# Patient Record
Sex: FEMALE | Race: WHITE | Employment: FULL TIME | URBAN - METROPOLITAN AREA
[De-identification: names, ages, dates, MRNs, and addresses within clinical notes are randomized per-mention and may not be internally consistent; named-entity substitution may affect disease eponyms.]

---

## 2023-11-28 ENCOUNTER — OFFICE VISIT (OUTPATIENT)
Dept: OBSTETRICS AND GYNECOLOGY | Facility: CLINIC | Age: 28
End: 2023-11-28
Payer: COMMERCIAL

## 2023-11-28 VITALS
HEIGHT: 64 IN | BODY MASS INDEX: 34.31 KG/M2 | DIASTOLIC BLOOD PRESSURE: 82 MMHG | SYSTOLIC BLOOD PRESSURE: 112 MMHG | WEIGHT: 201 LBS

## 2023-11-28 DIAGNOSIS — Z30.432 ENCOUNTER FOR IUD REMOVAL: Primary | ICD-10-CM

## 2023-11-28 DIAGNOSIS — Z31.69 ENCOUNTER FOR PRECONCEPTION CONSULTATION: ICD-10-CM

## 2023-11-28 PROCEDURE — 1036F TOBACCO NON-USER: CPT

## 2023-11-28 PROCEDURE — 58301 REMOVE INTRAUTERINE DEVICE: CPT

## 2023-11-28 NOTE — PROGRESS NOTES
Patient ID: Sindy Knowles is a 28 y.o. female.    IUD Removal    Date/Time: 11/28/2023 2:42 PM    Performed by: SHAUN Palm  Authorized by: SHAUN Palm    Consent:     Consent obtained:  Verbal and written    Consent given by:  Patient    Procedure risks and benefits discussed: yes      Patient questions answered: yes      Patient agrees, verbalizes understanding, and wants to proceed: yes      Educational handouts given: yes      Instructions and paperwork completed: yes    Procedure:     Removed with no complications: yes      Removal due to mechanical complications of IUD: no      Removal due to infection and inflammatory reaction: no      Other reason for removal:  Trying to conceive.   Mirena removal  Conceive first child   ANA Garsia     Patient is here today for Mirena IUD removal. Patient plans on trying to conceive.     Discussed with patient optimal timing of intercourse to improve the odds of conceiving. Discussed with patient ways to track ovulation including BBT, cervical mucus changes, and use of ovulation test strips. Discussed and offered Genetic carrier screening. Patient encouraged to take PNV daily with 400 mcg of folic acid.

## 2025-01-15 ENCOUNTER — APPOINTMENT (OUTPATIENT)
Dept: OBSTETRICS AND GYNECOLOGY | Facility: CLINIC | Age: 30
End: 2025-01-15
Payer: COMMERCIAL

## 2025-01-15 VITALS — BODY MASS INDEX: 34.36 KG/M2 | SYSTOLIC BLOOD PRESSURE: 114 MMHG | WEIGHT: 200.2 LBS | DIASTOLIC BLOOD PRESSURE: 68 MMHG

## 2025-01-15 DIAGNOSIS — Z3A.09 9 WEEKS GESTATION OF PREGNANCY (HHS-HCC): ICD-10-CM

## 2025-01-15 DIAGNOSIS — Z34.91 PRENATAL CARE IN FIRST TRIMESTER (HHS-HCC): Primary | ICD-10-CM

## 2025-01-15 LAB — PREGNANCY TEST URINE, POC: POSITIVE

## 2025-01-15 PROCEDURE — 90656 IIV3 VACC NO PRSV 0.5 ML IM: CPT

## 2025-01-15 PROCEDURE — 90471 IMMUNIZATION ADMIN: CPT

## 2025-01-15 PROCEDURE — 0500F INITIAL PRENATAL CARE VISIT: CPT

## 2025-01-15 PROCEDURE — 81025 URINE PREGNANCY TEST: CPT

## 2025-01-15 RX ORDER — DOXYLAMINE SUCCINATE 25 MG/1
12.5 TABLET ORAL NIGHTLY PRN
Qty: 30 TABLET | Refills: 1 | Status: SHIPPED | OUTPATIENT
Start: 2025-01-15

## 2025-01-15 RX ORDER — ASPIRIN 81 MG/1
162 TABLET ORAL DAILY
Qty: 180 TABLET | Refills: 3 | Status: SHIPPED | OUTPATIENT
Start: 2025-01-15 | End: 2026-01-15

## 2025-01-15 RX ORDER — DOXYLAMINE SUCCINATE 25 MG/1
12.5 TABLET ORAL
COMMUNITY
Start: 2024-12-19 | End: 2025-01-15 | Stop reason: SDUPTHER

## 2025-01-15 RX ORDER — PYRIDOXINE HCL (VITAMIN B6) 100 MG
100 TABLET ORAL
COMMUNITY
Start: 2024-12-19

## 2025-01-15 ASSESSMENT — EDINBURGH POSTNATAL DEPRESSION SCALE (EPDS)
I HAVE BEEN ABLE TO LAUGH AND SEE THE FUNNY SIDE OF THINGS: AS MUCH AS I ALWAYS COULD
THE THOUGHT OF HARMING MYSELF HAS OCCURRED TO ME: NEVER
I HAVE FELT SAD OR MISERABLE: NO, NOT AT ALL
THINGS HAVE BEEN GETTING ON TOP OF ME: NO, MOST OF THE TIME I HAVE COPED QUITE WELL
TOTAL SCORE: 4
I HAVE BEEN SO UNHAPPY THAT I HAVE HAD DIFFICULTY SLEEPING: NOT AT ALL
I HAVE BLAMED MYSELF UNNECESSARILY WHEN THINGS WENT WRONG: NOT VERY OFTEN
I HAVE BEEN SO UNHAPPY THAT I HAVE BEEN CRYING: NO, NEVER
I HAVE BEEN ANXIOUS OR WORRIED FOR NO GOOD REASON: HARDLY EVER
I HAVE FELT SCARED OR PANICKY FOR NO GOOD REASON: NO, NOT MUCH
I HAVE LOOKED FORWARD WITH ENJOYMENT TO THINGS: AS MUCH AS I EVER DID

## 2025-01-15 NOTE — PROGRESS NOTES
Subjective   Sindy Knowles is a 29 y.o.  at Unknown with a working estimated date of delivery of Not found. who presents for an initial prenatal visit. This pregnancy is planned.  Occupation:   Partner: [employer],  [employer].    Patient currently experiencing: nausea/vomitting,  , breast tenderness, fatigue. Taking b6 and unisom and doing well  Taking prenatal vitamin: Yes  Dating: by regular LMP    Pregravid BMI: 33.80  BMI over 30- 33.80. Hemoglobin A1C ordered. Limiting weight gain to 11-20lbs through healthy eating habits and exercise reviewed    Preeclampsia risk:  History of hypertension:  none  ASA prophylaxis: counseled and agreeable  Ob HX: IAB  PMH: none  Hx of metal health disorders: none  PSHX: appendectomy  SH: patient denies use of drugs, alcohol, or tobacco.   Pap HX: uncertain of last pap, though reports no abnormals    Counseled on flu and covid vaccines and agreeable.     OB History    Para Term  AB Living   1 0 0 0 0 0   SAB IAB Ectopic Multiple Live Births   0 0 0 0 0      # Outcome Date GA Lbr Pedrito/2nd Weight Sex Type Anes PTL Lv   1 Current              [place]  Depression Scale Total: 4  Prior pregnancy complications:   Medical Problems        Objective   Weight: 90.8 kg (200 lb 3.2 oz)  TW.452 kg (3 lb 3.2 oz)   Expected Total Weight Gain: 5 kg (11 lb)-9 kg (19 lb)   Pregravid BMI: 33.80     Pregnancy Problems (from 01/15/25 to present)       No problems associated with this episode.             Assessment /Plan     Genetic testing: The patient was counseled regarding prenatal genetic testing options and was provided literature in the obstetric folder. First line screening options, including cfDNA and first trimester ultrasound for nuchal translucency, were discussed and offered.  Benefits, drawbacks, and limitations were explained respectively.  It was clearly stated that only diagnostic testing via CVS or amniocentesis provides definitive information  regarding a genetic diagnosis in the fetus (risk of complications with CVS 1/200, risk of fetal loss with CVS 1/400; risk of complications with amniocentesis 1/400 and a risk of fetal loss with amniocentesis 1/1000). It was discussed with the patient that the false positive rates for NIPT is higher for women under 35 years of age. It was encouraged that patient's with high risk personal/family history be referred to genetics for additional screening and counselling. This patient has decided to pursue NIPS and NT scan. Patient declines carrier screening    Education provided r/t nutrition, prenatal vitamins, folic acid supplementation, dietary guidelines, exercise, smoking, alcohol, caffeine and drug use. Healthy weight gain in pregnancy discussed.     Physical activity -patient encouraged to be physically active throughout pregnancy to promote healthy weight gain.     Safety- patient informed not to clean litter boxes  and that if she does gardening to wear gloves. Discussed with patient that she may use Tylenol as a pain reliever, but to avoid the use of NSAIDS. Patient informed of safe seat belt use, avoidance of hot tubs and saunas, and when to stop air travel.     Dental health- patient encouraged to maintain good dental health through pregnancy with routine dental screening.     Infant feeding- discussed patient's infant feeding preferences and strongly encouraged breastfeeding     Patient is appropriate for midwifery care. Patient oriented to practice, collaborative practice model, and educated on visit frequency    Warning s/s discussed and SAB precautions reviewed.   RTC 4wks/prn -    NOB plan:   PE with pap at next visit- add gc/ct  Patient to schedule nurse visit for all new OB labs Monday  Patient to schedule NT scan   Flu vaccine today  Patient to begin  mg daily  Continue PNV  MITCH LANIER MA

## 2025-01-17 LAB — BACTERIA UR CULT: NORMAL
